# Patient Record
Sex: MALE | Race: WHITE | URBAN - METROPOLITAN AREA
[De-identification: names, ages, dates, MRNs, and addresses within clinical notes are randomized per-mention and may not be internally consistent; named-entity substitution may affect disease eponyms.]

---

## 2022-05-04 ENCOUNTER — OFFICE VISIT (OUTPATIENT)
Dept: URBAN - METROPOLITAN AREA CLINIC 33 | Facility: CLINIC | Age: 27
End: 2022-05-04
Payer: COMMERCIAL

## 2022-05-04 DIAGNOSIS — S05.01XA INJURY OF CONJUNCTIVA W/O FB OF RIGHT EYE, INITIAL ENCOUNTER: Primary | ICD-10-CM

## 2022-05-04 PROCEDURE — 99203 OFFICE O/P NEW LOW 30 MIN: CPT | Performed by: OPTOMETRIST

## 2022-05-04 RX ORDER — OFLOXACIN 3 MG/ML
0.3 % SOLUTION/ DROPS OPHTHALMIC
Qty: 5 | Refills: 0 | Status: ACTIVE
Start: 2022-05-04

## 2022-05-04 NOTE — IMPRESSION/PLAN
Impression: Injury of conjunctiva w/o FB of right eye, initial encounter: S05. 01XA. Plan: Patient was loading a copper pipe which had Flux and hit right eye. Conjunctival abrasion 6mm OD. Recommend patient starts Ofloxacin QID OD and artificial tears. BCL placed in OD today.  Return in 2 days for BCL removal.

## 2022-05-06 ENCOUNTER — OFFICE VISIT (OUTPATIENT)
Dept: URBAN - METROPOLITAN AREA CLINIC 33 | Facility: CLINIC | Age: 27
End: 2022-05-06
Payer: COMMERCIAL

## 2022-05-06 DIAGNOSIS — S05.01XD INJURY OF CONJUNCTIVA W/O FB OF RIGHT EYE, SUBSEQUENT ENCOUNTER: Primary | ICD-10-CM

## 2022-05-06 PROCEDURE — 99213 OFFICE O/P EST LOW 20 MIN: CPT | Performed by: OPTOMETRIST

## 2022-05-06 NOTE — IMPRESSION/PLAN
Impression: Injury of conjunctiva w/o FB of right eye, subsequent encounter: S05. 01XD. Plan: Resolving condition. BCL removed in office. Stop Ofloxacin at this time. No need for steroid. Advised patient to call if symptoms worsen.